# Patient Record
Sex: FEMALE | Race: WHITE
[De-identification: names, ages, dates, MRNs, and addresses within clinical notes are randomized per-mention and may not be internally consistent; named-entity substitution may affect disease eponyms.]

---

## 2017-01-08 ENCOUNTER — HOSPITAL ENCOUNTER (EMERGENCY)
Dept: HOSPITAL 68 - ERH | Age: 37
End: 2017-01-08
Payer: COMMERCIAL

## 2017-01-08 VITALS — SYSTOLIC BLOOD PRESSURE: 116 MMHG | DIASTOLIC BLOOD PRESSURE: 79 MMHG

## 2017-01-08 VITALS — BODY MASS INDEX: 23.98 KG/M2 | WEIGHT: 127 LBS | HEIGHT: 61 IN

## 2017-01-08 DIAGNOSIS — K29.70: Primary | ICD-10-CM

## 2017-01-08 LAB
ABSOLUTE GRANULOCYTE CT: 12 /CUMM (ref 1.4–6.5)
BASOPHILS # BLD: 0.1 /CUMM (ref 0–0.2)
BASOPHILS NFR BLD: 0.5 % (ref 0–2)
EOSINOPHIL # BLD: 0.1 /CUMM (ref 0–0.7)
EOSINOPHIL NFR BLD: 0.8 % (ref 0–5)
ERYTHROCYTE [DISTWIDTH] IN BLOOD BY AUTOMATED COUNT: 13 % (ref 11.5–14.5)
GRANULOCYTES NFR BLD: 86 % (ref 42.2–75.2)
HCT VFR BLD CALC: 44.8 % (ref 37–47)
LYMPHOCYTES # BLD: 1.2 /CUMM (ref 1.2–3.4)
MCH RBC QN AUTO: 31.8 PG (ref 27–31)
MCHC RBC AUTO-ENTMCNC: 34.2 G/DL (ref 33–37)
MCV RBC AUTO: 93 FL (ref 81–99)
MONOCYTES # BLD: 0.6 /CUMM (ref 0.1–0.6)
PLATELET # BLD: 315 /CUMM (ref 130–400)
PMV BLD AUTO: 7.5 FL (ref 7.4–10.4)
RED BLOOD CELL CT: 4.82 /CUMM (ref 4.2–5.4)
WBC # BLD AUTO: 13.9 /CUMM (ref 4.8–10.8)

## 2017-01-08 NOTE — ED GI/GU/ABDOMINAL COMPLAINT
History of Present Illness
 
General
Chief Complaint: Abdominal Pain/Flank Pain
Stated Complaint: SEVERE ABD. PAIN
Source: patient
Exam Limitations: no limitations
 
Vital Signs & Intake/Output
Vital Signs & Intake/Output
 Vital Signs
 
 
Date Time Temp Pulse Resp B/P Pulse O2 O2 Flow FiO2
 
     Ox Delivery Rate 
 
 2242  98 81 116/79 98 Room Air  
 
 1939 98.6 116 20 134/84 98 Room Air  
 
 
 ED Intake and Output
 
 
  0000  1200
 
Intake Total 1000 
 
Output Total  
 
Balance 1000 
 
   
 
Intake, IV 1000 
 
Patient 127 lb 
 
Weight  
 
 
Allergies
Coded Allergies:
No Known Allergies (17)
 
Reconcile Medications
Biotin (Meribin) (Unknown Strength) CAPSULE   (Unknown Dose) PO DAILY SUPPLEMENT
 (Reported)
Cholecalciferol (Vitamin D3) (Vitamin D) (Unknown Strength) CAPSULE   (Unknown 
Dose) PO DAILY SUPPLEMENT  (Reported)
L-Norgest/E.estradion-E.estrad (Seasonique 0.15-0.03-0.01 Tab) 0.15 MG-30 MCG (
84)/10 MCG (7) TBDSPK.3MO   1 TAB PO DAILY BIRTH CONTROL  (Reported)
Loratadine (Claritin) 10 MG TABLET   1 TAB PO DAILY ALLERGIES  (Reported)
Multivitamin (Multi-Day Vitamins) 1 EACH TABLET   1 TAB PO DAILY SUPPLEMENT  (
Reported)
Pantoprazole Sodium (Protonix) 40 MG TABLET.DR   1 TAB PO DAILY REFLUX
Ranitidine (Ranitidine HCl) 150 MG TABLET   1 TAB PO BID GI  (Reported)
 
Triage Note:
PT TO TRIAGE WITH C/O EPIGASTRIC PAIN 7/10 SINCE
 3PM, +NAUSEA, VOMITINGx4. NO OTHER SYMPTOMS.
 PT AFEBRILE IN TRIAGE. VSS.
Triage Nurses Notes Reviewed? yes
Pregnant? N
Is pt currently breastfeeding? No
HPI:
36-year-old female here with complaints of a centralized abdominal pain that 
started suddenly this afternoon after eating.  She has history of reflux and 
pelvis with thoracic reflux, shook her usual medication without relief, is still
getting worse she vomited multiple times.  She has no fever or flulike illness. 
She has no diarrhea.  She had a normal bowel movement earlier today.  She had an
exploratory laparoscopy for pain which is found to the cause of ovarian cyst.  
She has no pelvic pain and abnormal vaginal discharge or bleeding, no back pain.
 Pain is severe and sharp
(SREEKANTH MARTIN,CORINNE)
 
Past History
 
Travel History
Traveled to [place] past 21 day No
 
Medical History
Any Pertinent Medical History? see below for history
Gastrointestinal: GERD
Renal: ONE KIDNEY
 
Surgical History
Surgical History: ex lap
 
Psychosocial History
What is your primary language English
Tobacco Use: Never used
 
Family History
Hx Contributory? No
(CORINNE MELTON)
 
Review of Systems
 
Review of Systems
Constitutional:
Reports: see HPI. 
EENTM:
Reports: no symptoms. 
Respiratory:
Reports: no symptoms. 
Cardiovascular:
Reports: no symptoms. 
GI:
Reports: see HPI. 
Genitourinary:
Reports: no symptoms. 
Musculoskeletal:
Reports: no symptoms. 
Skin:
Reports: no symptoms. 
Neurological/Psychological:
Reports: no symptoms. 
Hematologic/Endocrine:
Reports: no symptoms. 
Immunologic/Allergic:
Reports: no symptoms. 
All Other Systems: Reviewed and Negative
(CORINNE MELTON)
 
Physical Exam
 
Physical Exam
General Appearance: well developed/nourished, anxious, moderate distress
Gastrointestinal: normal bowel sounds, soft, tenderness (mid abd tenderness)
Comments:
Well-developed well-nourished no apparent distress.
HEENT: Atraumatic, extraocular motion intact
Neck: Supple, no lymphadenopathy
Back: Nontender, no CVA tenderness
Respiratory: No respiratory distress clear to auscultation bilateral.
Heart: Regular rhythm no murmur
Extremities: No edema, full range of motion
Neuro: Alert and oriented x3
Psych: Mood affect normal, normal memory normal judgment.
Skin: Warm and dry, no rash on exposed skin
 
Core Measures
ACS in differential dx? No
Severe Sepsis Present: No
Septic Shock Present: No
(CORINNE MELTON)
 
Progress
Differential Diagnosis: appendicitis, biliary colic, bowel obstruction, colon 
cancer, cholecystitis, diverticulitis, ectopic pregnancy, endometritis, 
esophageal varices, gastritis, hepatitis, hernia, hemorrhoids, ischemic bowel, 
inflamm bowel dis, intrauterine pregnancy, kidney stone, Johanny-Darnell tear, 
ovarian cyst, ovarian torsion, pancreatitis, PID/cervicitis, peptic ulcer, PUD/
GERD, perforated viscous, SBO, threatened AB, UTI/pyelo
Plan of Care:
 Orders
 
 
Procedure Date/time Status
 
URINALYSIS 1956 Complete
 
LIPASE 1956 Complete
 
HUMAN BETA HCG SCREEN 1956 Complete
 
COMPREHENSIVE METABOLIC PANEL 1956 Complete
 
CBC WITHOUT DIFFERENTIAL 1956 Complete
 
AMYLASE 1956 Complete
 
 
 Laboratory Tests
 
 
 
179:
Urine Color YEL, Urine Clarity HAZY  H, Urine pH 7.5, Ur Specific Gravity 1.015,
Urine Protein NEG, Urine Ketones TRACE  H, Urine Nitrite NEG, Urine Bilirubin 
NEG, Urine Urobilinogen 0.2, Ur Leukocyte Esterase NEG, Ur Microscopic SEDIMENT 
EXAMINED, Urine RBC 1-3, Urine WBC 1-3  H, Ur Epithelial Cells FEW, Urine 
Hemoglobin TRACE-INTACT, Urine Glucose NEG
 
17 2011:
Anion Gap 15, Estimated GFR > 60, BUN/Creatinine Ratio 16.7, Glucose 95, Calcium
9.4, Total Bilirubin 0.8, AST 19, ALT 21, Alkaline Phosphatase 50, Total Protein
8.1, Albumin 4.6, Globulin 3.5, Albumin/Globulin Ratio 1.3, Amylase 70, Lipase 
69, Total Beta HCG NEGATIVE, CBC w Diff NO MAN DIFF REQ, RBC 4.82, MCV 93.0, MCH
31.8  H, RDW 13.0, MPV 7.5, Gran % 86.0  H, Lymphocytes % 8.6  L, Monocytes % 
4.1, Eosinophils % 0.8, Basophils % 0.5, Absolute Granulocytes 12.0  H, Absolute
Lymphocytes 1.2, Absolute Monocytes 0.6, Absolute Eosinophils 0.1, Absolute 
Basophils 0.1, PUBS MCHC 34.2
Diagnostic Imaging:
Viewed by Me: CT Scan.  Discussed w/RAD: CT Scan. 
Initial ED EKG: none
Comments:
Treated with IV fluids, IV Zofran and 2 mg IV morphine.
Patient reevaluated and still has complaints of pain although much improved.  
White count is elevated and will evaluate further with CT scan the abdomen and 
pelvis.
 
 
 
CT abdomen and pelvis results as follows:
 
PATIENT: STEVIE EMERY  MEDICAL RECORD NO: [id number]
PRESENT AGE: 36  PATIENT ACCOUNT NO: [id number]
: 82  LOCATION: [facility]
ORDERING PHYSICIAN: CORINNE MARTIN  
 
  SERVICE DATE: 
EXAM TYPE: CAT - CT ABD & PELVIS W IV CONTRAST
 
EXAMINATION:
CT ABDOMEN AND PELVIS WITH CONTRAST
 
CLINICAL INFORMATION:
Lower abdominal pain. Concern for appendicitis.
 
COMPARISON:
None.
 
TECHNIQUE:
Multidetector volumetric imaging was performed of the abdomen and pelvis
before and after the IV administration of 95 mL of Optiray 320 intravenous
contrast. Sagittal and coronal reformatted images were obtained on the
technologist's workstation. No oral contrast.
 
DLP:
263.74 mGy-cm.
 
FINDINGS:
 
LUNG BASES: The visualized lung bases are unremarkable.
 
LIVER, GALLBLADDER, AND BILIARY TREE: The liver is normal in size, shape, and
attenuation. No focal hepatic lesion or biliary ductal dilatation is present.
The gallbladder is unremarkable with no evidence of radiopaque gallstones,
gallbladder wall thickening, or obvious pericholecystic inflammatory changes.
 
 
PANCREAS: Unremarkable.
 
SPLEEN: The spleen is normal in size with a small accessory spleen.
 
ADRENAL GLANDS: Unremarkable.
 
KIDNEYS AND URETERS: There is a solitary left kidney. This is likely a
congenital variant. Marked compensatory hypertrophy is noted. No
hydronephrosis. No perinephric collection. No left renal calculus.
 
BLADDER: Unremarkable.
 
GASTROINTESTINAL TRACT: The visualized esophagus is normal. The stomach is
distended with fluid. Normal small bowel caliber. No mesenteric process
noted.
A normal appendix is seen in the right paracolic gutter. The terminal ileum
is normal in appearance. No pericecal fat stranding.
No evidence of colitis.
No free air. No free fluid. No abscess.
 
ABDOMINAL WALL: Tiny fat-containing umbilical hernia.
 
LYMPH NODES: Normal.
 
VASCULAR: There is a compensatory enlargement of the solitary left renal
artery and vein. The left renal vein is somewhat pinched as it passes between
the SMA and aorta. Mesenteric vessels enhance normally.
 
PELVIC VISCERA: Uterus is nonstandard in appearance and I suspect there is a
left unicornuate uterus. This can be followed up with pelvic ultrasound.
The left ovary is not well seen. No pelvic mass. No pelvic adenopathy or free
fluid.
 
OSSEOUS STRUCTURES: Unremarkable.
 
IMPRESSION:
1. No acute inflammatory process is identified. No clear etiology for lower
abdominal pain. Close clinical correlation and follow-up is advised.
 
2. Solitary left kidney with compensatory enlargement.
 
3. 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 Recommend a follow-up with pelvic
ultrasound. No adnexal mass seen. Left ovary not identified.
 
4. A normal appendix is seen in the right paracolic gutter. No pericecal
inflammation. No abscess or bowel obstruction. No free air or free fluid.
 
DICTATED BY: PRIETO SAAVEDRA MD 
DATE/TIME DICTATED:17
:RAD.GA 
DATE/TIME TRANSCRIBED:17
 
Patient given GI cocktail and symptoms resolved.  She was given a Protonix for 
reflux and recommend follow up with GI.  She was previously aware of her one 
kidney and abnormal uterus.  
 
(CORINNE MELTON)
 
Departure
 
Departure
Disposition: HOME OR SELF CARE
Condition: Stable
Clinical Impression
Primary Impression: Reflux gastritis
Referrals:
ELAN MCMANUS MD
 
Additional Instructions:
TAKE PROTONIX AS DIRECTED FOR REFLUX. 
RETURN WITH WOSRENING ABD PAIN, NAUSEA, VOMITING. 
Departure Forms:
Customer Survey
General Discharge Information
Prescriptions:
Current Visit Scripts
Pantoprazole Sodium (Protonix) 1 TAB PO DAILY 
     #7 TAB 
 
 
(CORINNE MELTON)
 
PA/NP Co-Sign Statement
Statement:
ED Attending supervision documentation-
 
[] I saw and evaluated the patient. I have also reviewed all the pertinent lab 
results and diagnostic results. I agree with the findings and the plan of care 
as documented in the PA's/NP's documentation. 
 
[x] I have reviewed the ED Record and agree with the PA's/NP's documentation.
 
[] Additions or exceptions (if any) to the PAs/NP's note and plan are 
summarized below:
[]
 
(IVON NAVARRO,FREDRICK QUEEN)

## 2017-01-08 NOTE — CT SCAN REPORT
EXAMINATION:
CT ABDOMEN AND PELVIS WITH CONTRAST
 
CLINICAL INFORMATION:
Lower abdominal pain. Concern for appendicitis.
 
COMPARISON:
None.
 
TECHNIQUE:
Multidetector volumetric imaging was performed of the abdomen and pelvis
before and after the IV administration of 95 mL of Optiray 320 intravenous
contrast. Sagittal and coronal reformatted images were obtained on the
technologist's workstation. No oral contrast.
 
DLP:
263.74 mGy-cm.
 
FINDINGS:
 
LUNG BASES: The visualized lung bases are unremarkable.
 
LIVER, GALLBLADDER, AND BILIARY TREE: The liver is normal in size, shape, and
attenuation. No focal hepatic lesion or biliary ductal dilatation is present.
The gallbladder is unremarkable with no evidence of radiopaque gallstones,
gallbladder wall thickening, or obvious pericholecystic inflammatory changes.
 
 
PANCREAS: Unremarkable.
 
SPLEEN: The spleen is normal in size with a small accessory spleen.
 
ADRENAL GLANDS: Unremarkable.
 
KIDNEYS AND URETERS: There is a solitary left kidney. This is likely a
congenital variant. Marked compensatory hypertrophy is noted. No
hydronephrosis. No perinephric collection. No left renal calculus.
 
BLADDER: Unremarkable.
 
GASTROINTESTINAL TRACT: The visualized esophagus is normal. The stomach is
distended with fluid. Normal small bowel caliber. No mesenteric process
noted.
A normal appendix is seen in the right paracolic gutter. The terminal ileum
is normal in appearance. No pericecal fat stranding.
No evidence of colitis.
No free air. No free fluid. No abscess.
 
ABDOMINAL WALL: Tiny fat-containing umbilical hernia.
 
LYMPH NODES: Normal.
 
VASCULAR: There is a compensatory enlargement of the solitary left renal
artery and vein. The left renal vein is somewhat pinched as it passes between
the SMA and aorta. Mesenteric vessels enhance normally.
 
PELVIC VISCERA: Uterus is nonstandard in appearance and I suspect there is a
left unicornuate uterus. This can be followed up with pelvic ultrasound.
The left ovary is not well seen. No pelvic mass. No pelvic adenopathy or free
fluid.
 
OSSEOUS STRUCTURES: Unremarkable.
 
IMPRESSION:
1. No acute inflammatory process is identified. No clear etiology for lower
abdominal pain. Close clinical correlation and follow-up is advised.
 
2. Solitary left kidney with compensatory enlargement.
 
3. 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 Recommend a follow-up with pelvic
ultrasound. No adnexal mass seen. Left ovary not identified.
 
4. A normal appendix is seen in the right paracolic gutter. No pericecal
inflammation. No abscess or bowel obstruction. No free air or free fluid.

## 2018-07-04 ENCOUNTER — HOSPITAL ENCOUNTER (EMERGENCY)
Dept: HOSPITAL 68 - ERH | Age: 38
End: 2018-07-04
Payer: COMMERCIAL

## 2018-07-04 VITALS — SYSTOLIC BLOOD PRESSURE: 116 MMHG | DIASTOLIC BLOOD PRESSURE: 72 MMHG

## 2018-07-04 DIAGNOSIS — L03.012: Primary | ICD-10-CM

## 2018-07-04 NOTE — ED HAND/WRIST INJURY COMPLAINT
History of Present Illness
 
General
Chief Complaint: Hand or Wrist Injury
Stated Complaint: LT THUMB INFECTION X'S 2 DAYS
Source: patient
Exam Limitations: no limitations
 
Vital Signs & Intake/Output
Vital Signs & Intake/Output
 Vital Signs
 
 
Date Time Temp Pulse Resp B/P B/P Pulse O2 O2 Flow FiO2
 
     Mean Ox Delivery Rate 
 
07/04 0656 98.6 78 20 116/72  99   
 
 
 
Allergies
Coded Allergies:
No Known Allergies (01/08/17)
 
Reconcile Medications
Amoxicillin 250 MG CAPSULE   1 CAP PO TID FINGER INFECTION
L-Norgest/E.estradion-E.estrad (Seasonique 0.15-0.03-0.01 Tab) 0.15 MG-30 MCG (
84)/10 MCG (7) TBDSPK.3MO   1 TAB PO DAILY BIRTH CONTROL  (Reported)
Pantoprazole Sodium (Protonix) 40 MG TABLET.DR   1 TAB PO DAILY REFLUX
Ranitidine (Ranitidine HCl) 150 MG TABLET   1 TAB PO BID GI  (Reported)
 
Triage Note:
PER PT 2 DAYS SUSTAINED LAC OR SLICE TO L THUMB
THIS AM WOKE UP WITH IT THROBBING,
SWELLING TO SIDE OF NAIL SL RED
Triage Nurses Notes Reviewed? yes
Pregnant: No
Patient currently breastfeeds: No
HPI:
Patient accidentally cut the side of her left thumb 2 days ago.  Since then the 
pad of her thumb has been swollen and red to touch.  The pain is throbbing in 
nature.  The pain increases with any pressure to the area.  There is no 
radiation.  There is no radiation of the redness.  There are no fevers or 
chills.  She rates the pain at 6 out of 10.
 
Past History
 
Travel History
Traveled to [place] past 21 day No
 
Medical History
Any Pertinent Medical History? see below for history
Neurological: NONE
EENT: NONE
Cardiovascular: NONE
Respiratory: NONE
Gastrointestinal: GERD
Renal: ONE KIDNEY
Musculoskeletal: NONE
Psychiatric: NONE
Endocrine: NONE
 
Surgical History
Surgical History: ex lap
 
Psychosocial History
What is your primary language English
Tobacco Use: Never used
ETOH Use: occasional use
Illicit Drug Use: denies illicit drug use
 
Family History
Hx Contributory? No
 
Review of Systems
 
Review of Systems
Constitutional:
Reports: no symptoms. 
Respiratory:
Reports: no symptoms. 
Cardiovascular:
Reports: no symptoms. 
Musculoskeletal:
Reports: see HPI. 
Skin:
Reports: no symptoms. 
Neurological/Psychological:
Reports: no symptoms. 
Immunologic/Allergic:
Reports: no symptoms. 
 
Physical Exam
 
Physical Exam
General Appearance: well developed/nourished, alert, awake, anxious, mild 
distress
Eyes:
Bilateral: PERRL, EOMI. 
Neck: normal inspection, supple
Cardiovascular/Respiratory: normal breath sounds, normal peripheral pulses, 
regular rate/rhythm
Hand Left: swelling, tender, 1st finger, NO SPREAD OF THE ERYTHMEA
Hand Right: normal inspection, normal range of motion
Neurologic/Tendon: normal sensation, normal motor functions, normal tendon 
functions
Lymphatic: NO AXIALLRY ADENOPATHY
 
Progress
Differential Diagnosis: felon
Plan of Care:
 Current Medications
 
 
  Sig/Moise Start time  Last
 
Medication Dose  Stop Time Status Admin
 
Ceftriaxone Sodium 1,000 MG ONCE ONE 07/04 0715 AC 
 
(Rocephin)   07/04 0716  
 
 
 
Comments:
Discussed with the patient the possibility of opening FL on up.  Patient wants 
to try antibiotics prior to having that done.  Patient states she will return 
within 2 days for reevaluation.
 
Departure
 
Departure
Disposition: HOME OR SELF CARE
Condition: Stable
Clinical Impression
Primary Impression: Felon of finger of left hand
Referrals:
Patient Has No Primary Care Dr (PCP/Family)
 
Additional Instructions:
RETURN IN 2 DAYS FOR A RE-EVAL
TAKE AMOXIL AS DIRECTED STARTING TOMORROW SINCE YOU RECEIVED A DOSE THREW THE IV
THAT WILL BE GOOD FOR 24 HOURS
RETURN SOONER IF SYMPTOMS WORSEN OR FOR ANY CONCERNS
Departure Forms:
Customer Survey
General Discharge Information
Prescriptions:
Current Visit Scripts
Amoxicillin 1 CAP PO TID  
     #30 CAP

## 2018-07-06 ENCOUNTER — HOSPITAL ENCOUNTER (EMERGENCY)
Dept: HOSPITAL 68 - ERH | Age: 38
End: 2018-07-06
Payer: COMMERCIAL

## 2018-07-06 VITALS — SYSTOLIC BLOOD PRESSURE: 121 MMHG | DIASTOLIC BLOOD PRESSURE: 81 MMHG

## 2018-07-06 VITALS — BODY MASS INDEX: 23 KG/M2 | HEIGHT: 62 IN | WEIGHT: 125 LBS

## 2018-07-06 DIAGNOSIS — Z48.01: Primary | ICD-10-CM

## 2018-07-06 NOTE — ED ANIMAL BITE/WOUND CHECK
History of Present Illness
 
General
Chief Complaint: Suture Removal/Wound Recheck
Stated Complaint: WOUND CHECK OF CELLULITIS?
Source: patient
Exam Limitations: no limitations
 
Vital Signs & Intake/Output
Vital Signs & Intake/Output
 Vital Signs
 
 
Date Time Temp Pulse Resp B/P B/P Pulse O2 O2 Flow FiO2
 
     Mean Ox Delivery Rate 
 
07/06 1204 96.2 84 20 125/84  100 Room Air  
 
 
 
Allergies
Coded Allergies:
No Known Allergies (01/08/17)
 
Reconcile Medications
Amoxicillin 250 MG CAPSULE   1 CAP PO TID FINGER INFECTION
L-Norgest/E.estradion-E.estrad (Seasonique 0.15-0.03-0.01 Tab) 0.15 MG-30 MCG (
84)/10 MCG (7) TBDSPK.3MO   1 TAB PO DAILY BIRTH CONTROL  (Reported)
Pantoprazole Sodium (Protonix) 40 MG TABLET.DR   1 TAB PO DAILY REFLUX
Ranitidine (Ranitidine HCl) 150 MG TABLET   1 TAB PO BID GI  (Reported)
 
Triage Note:
PT TO ED FOR WOUND CHECK TO LEFT THUMB. PT WAS
 DIAGNOSED WITH CELLULITIS TO LEFT THUMB ON 7/4.
 SENT HOME WITH PO ABX, HAS BEEN TAKING AS
 PRESCRIBED. TOLD TO COME BACK FOR WOUND CHECK.
Triage Nurses Notes Reviewed? yes
Injury Environment: home
Severity: mild
Severity Numbers: 3
Pregnant: No
Patient currently breastfeeds: No
HPI:
Patient is a 37-year-old female who presents emergency room with concerns of 
evaluation of a wound recheck in which patient states that on July 3 she 
actually her left thumb lateral cuticle region of her nail in which she was 
complaining of swelling and redness where she was evaluated at [facility] emergency
room 2 days ago patient received IV antibiotics and was prescribed amoxicillin 
patient states that the symptoms have improved however is complaining of mild 
pain and swelling.  Patient denies any fevers and otherwise without complaints. 
Patient has been compliant with her antibiotics taking ibuprofen and Tylenol for
pain
 
Past History
 
Travel History
Traveled to [place] past 21 day No
 
Medical History
Any Pertinent Medical History? see below for history
Neurological: NONE
EENT: NONE
Cardiovascular: NONE
Respiratory: NONE
Gastrointestinal: GERD
Renal: ONE KIDNEY
Musculoskeletal: NONE
Psychiatric: NONE
Endocrine: NONE
 
Surgical History
Surgical History: ex lap
 
Psychosocial History
What is your primary language English
Tobacco Use: Never used
ETOH Use: occasional use
Illicit Drug Use: denies illicit drug use
 
Family History
Hx Contributory? No
 
Review of Systems
 
Review of Systems
Constitutional:
Reports: no symptoms. 
EENTM:
Reports: no symptoms. 
Respiratory:
Reports: no symptoms. 
Cardiovascular:
Reports: no symptoms. 
GI:
Reports: no symptoms. 
Genitourinary:
Reports: no symptoms. 
Musculoskeletal:
Reports: see HPI. 
Skin:
Reports: see HPI. 
Neurological/Psychological:
Reports: no symptoms. 
Hematologic/Endocrine:
Reports: no symptoms. 
Immunologic/Allergic:
Reports: no symptoms. 
All Other Systems: Reviewed and Negative
 
Physical Exam
 
Physical Exam
General Appearance: no apparent distress, alert, comfortable
Head: atraumatic
Eyes:
Bilateral: normal appearance. 
Ears, Nose, Throat: hearing grossly normal
Neck: normal inspection
Respiratory: normal breath sounds
Cardiovascular: regular rate/rhythm
Peripheral Pulses:
2+ radial (L)
Skin: intact
 
Diagram
Hands, Dorsum:
 
  1) Noted lateral of the nail mild erythema and tenderness no swelling skin 
intact no induration or fluctuance
 
Progress
Differential Diagnosis: abscess, cellulitis, joint infection
Plan of Care:
Patient states that the symptoms have improved mild cellulitis is still observed
 
 patient was strongly advised to continue antibiotics and to return to emergency
room symptoms worsen and she will comply
 
 
Departure
 
Departure
Disposition: HOME OR SELF CARE
Condition: Stable
Clinical Impression
Primary Impression: Cellulitis of thumb, left
Referrals:
Patient Has No Primary Care Dr (PCP/Family)
 
Additional Instructions:
As discussed  continue antibiotics of amoxicillin.  If symptoms worsen or if he 
develop any new concerning symptom return to emergency room continue with 
Tylenol and Motrin for pain
Departure Forms:
Customer Survey
General Discharge Information